# Patient Record
(demographics unavailable — no encounter records)

---

## 2025-03-24 NOTE — PHYSICAL EXAM
[General Appearance - Well Developed] : well developed [General Appearance - Well Nourished] : well nourished [Respiration, Rhythm And Depth] : normal respiratory rhythm and effort [Auscultation Breath Sounds / Voice Sounds] : lungs were clear to auscultation bilaterally [Heart Rate And Rhythm] : heart rate and rhythm were normal [Heart Sounds] : normal S1 and S2 [Murmurs] : no murmurs present [Arterial Pulses Normal] : the arterial pulses were normal [Bowel Sounds] : normal bowel sounds [Abdomen Tenderness] : non-tender [Nail Clubbing] : no clubbing of the fingernails [Petechial Hemorrhages (___cm)] : no petechial hemorrhages [Skin Turgor] : normal skin turgor [] : no rash [Oriented To Time, Place, And Person] : oriented to person, place, and time [Impaired Insight] : insight and judgment were intact

## 2025-03-24 NOTE — HISTORY OF PRESENT ILLNESS
[FreeTextEntry1] : This is a 62 year old pmh borderline elevated cholesterol. She has a family hx of carotid stenosis ( father bilateral CEA). She has no known medical history and takes a baby aspirin for overall health. She admit that is she climbs stairs briskly she is dyspnic on exertion. admits to rare episodes of chest pain with associated indigestion.    3/24 RTC for follow up. Denies any current symptoms. still has episode of chest pain with indigestion did not get CCTA yet.     ==== data reviewed today=== EKG sr with LAFB  [facility]  2/2025 TSH 0.76  Lipo A 20  HDL78 TG 59  A1c 5.1  CTA head no evidence of flow limiting stenosis, occlusion or aneurysm/ CTA mild stenosis of pRICA  TTE 2/6/25 EF 63% no sig VHD

## 2025-03-24 NOTE — ASSESSMENT
[FreeTextEntry1] : this is a 62 year old with strong family hx of Carotid disease ( bilateral), mother passed of cerebral aneurysm. She is here today to establish care  # BANG with climbing stairs.  #chest pain  # Hyperlipidemia  7/18/24 , , HDL 73 TG 89 # family hx of Carotid stenosis (71) # family hx of cerebral aneurysm ( 67)   plan  given has soft pRICA would start low dose crestor 5mg po daily  check CCTA with metoprolol 12 hour x 1 day and morning of. CCTA did not get testing encouraged to f/u lab work in 2 months after starting stating RTC after testing    I, Dr. Waters, personally performed the evaluation and management (E/M) services for this established patient who presents today with (a) new problem(s)/exacerbation of (an) existing condition(s). That E/M includes conducting the clinically appropriate interval history &/or exam, assessing all new/exacerbated conditions, and establishing a new plan of care. Today, Resident/fellow and Merari Mcneal, was here to observe &/or participate in the visit & follow plan of care established by me.

## 2025-07-28 NOTE — ASSESSMENT
[FreeTextEntry1] : 63 yo F w HTN not on meds, mild carotid stenosis presents for follow up after being diagnosed with L retinal artery occlusion last week at [place].  #L retinal artery occlusion, r/o cardioembolic source  #htn   Plan  -start Losartan 25 mg daily for HTN  -CCTA, scan aortic arch and great vessels as well  -MCOT for 1 month to r./o AF -hypercoagulable workup-CBC, PTT, PT, protein C, protein S  -f/u in 2 months   I, Dr. Waters, personally performed the evaluation and management (E/M) services for this established patient who presents today with (a) new problem(s)/exacerbation of (an) existing condition(s). That E/M includes conducting the clinically appropriate interval history &/or exam, assessing all new/exacerbated conditions, and establishing a new plan of care. Today, Resident/fellow and Merari Mcneal, was here to observe &/or participate in the visit & follow plan of care established by me.

## 2025-07-28 NOTE — HISTORY OF PRESENT ILLNESS
[FreeTextEntry1] : 61 yo F w HTN not on meds, mild carotid stenosis presents for follow up after being diagnosed with L retinal artery occlusion last week at [place].  She started to experience blurry vision early July, workup including a retinal angiogram diagnosed L retinal artery occlusion. Reports prior similar episode in the same eye 11 years ago.       ==== data reviewed today=== EKG sr with LAFB  [facility] 2/2025 TSH 0.76  Lipo A 20  HDL78 TG 59  A1c 5.1  CTA head no evidence of flow limiting stenosis, occlusion or aneurysm/ CTA mild stenosis of pRICA  TTE 2/6/25 EF 63% no sig VHD